# Patient Record
Sex: FEMALE | Race: BLACK OR AFRICAN AMERICAN | NOT HISPANIC OR LATINO | Employment: UNEMPLOYED | URBAN - METROPOLITAN AREA
[De-identification: names, ages, dates, MRNs, and addresses within clinical notes are randomized per-mention and may not be internally consistent; named-entity substitution may affect disease eponyms.]

---

## 2024-03-27 ENCOUNTER — HOSPITAL ENCOUNTER (OUTPATIENT)
Dept: RADIOLOGY | Facility: CLINIC | Age: 34
Discharge: HOME | End: 2024-03-27
Payer: MEDICAID

## 2024-03-27 ENCOUNTER — OFFICE VISIT (OUTPATIENT)
Dept: ORTHOPEDIC SURGERY | Facility: CLINIC | Age: 34
End: 2024-03-27
Payer: MEDICAID

## 2024-03-27 VITALS — BODY MASS INDEX: 44.41 KG/M2 | WEIGHT: 293 LBS | HEIGHT: 68 IN

## 2024-03-27 DIAGNOSIS — M25.562 CHRONIC PAIN OF LEFT KNEE: ICD-10-CM

## 2024-03-27 DIAGNOSIS — M17.12 PRIMARY OSTEOARTHRITIS OF LEFT KNEE: Primary | ICD-10-CM

## 2024-03-27 DIAGNOSIS — G89.29 CHRONIC PAIN OF LEFT KNEE: ICD-10-CM

## 2024-03-27 PROCEDURE — 20611 DRAIN/INJ JOINT/BURSA W/US: CPT | Performed by: FAMILY MEDICINE

## 2024-03-27 PROCEDURE — 1036F TOBACCO NON-USER: CPT | Performed by: FAMILY MEDICINE

## 2024-03-27 PROCEDURE — 99213 OFFICE O/P EST LOW 20 MIN: CPT | Performed by: FAMILY MEDICINE

## 2024-03-27 PROCEDURE — 73562 X-RAY EXAM OF KNEE 3: CPT | Mod: LT

## 2024-03-27 RX ORDER — TRIAMCINOLONE ACETONIDE 40 MG/ML
40 INJECTION, SUSPENSION INTRA-ARTICULAR; INTRAMUSCULAR
Status: COMPLETED | OUTPATIENT
Start: 2024-03-27 | End: 2024-03-27

## 2024-03-27 RX ORDER — LIDOCAINE HYDROCHLORIDE 20 MG/ML
2 INJECTION, SOLUTION INFILTRATION; PERINEURAL
Status: COMPLETED | OUTPATIENT
Start: 2024-03-27 | End: 2024-03-27

## 2024-03-27 RX ADMIN — LIDOCAINE HYDROCHLORIDE 2 ML: 20 INJECTION, SOLUTION INFILTRATION; PERINEURAL at 15:12

## 2024-03-27 RX ADMIN — TRIAMCINOLONE ACETONIDE 40 MG: 40 INJECTION, SUSPENSION INTRA-ARTICULAR; INTRAMUSCULAR at 15:12

## 2024-03-27 NOTE — PROGRESS NOTES
History of Present Illness   Chief Complaint   Patient presents with    Left Knee - Follow-up       The patient is 33 y.o. female  here for follow-up of left knee pain.  Patient was seen by me a little less than 2 years ago for some atraumatic left knee pain, x-rays at that time showed some mild degenerative changes, symptoms thought to be secondary to mild underlying osteoarthritis as well as related to her obesity.  Patient trialed some initial conservative management with meloxicam, activity modification however she had ongoing pain prompting us to treat her with a one-time knee joint injection with Kenalog.  She says this was of significant benefit in her knee had been doing really well until a few weeks ago.  Says that she has been trying to increase her activity levels in the gym, says that she went pretty hard in the gym for several days in a row and after this she developed some more notable pain in her knee, she points to the lateral aspect of her knee as area of discomfort, she has pain with walking, stairs, admits to some feelings of instability at times.  She is unsure if there is swelling.  She has been taking ibuprofen with minimal improvement in symptoms.  Patient says that her weight is relatively unchanged over the past 2 years, feels like it may be up even some, says they have been dealing with some hormonal issues that she thinks may be contributing    No past medical history on file.    Medication Documentation Review Audit    **Prior to Admission medications have not yet been reviewed**         No Known Allergies    Social History     Socioeconomic History    Marital status: Single     Spouse name: Not on file    Number of children: Not on file    Years of education: Not on file    Highest education level: Not on file   Occupational History    Not on file   Tobacco Use    Smoking status: Never    Smokeless tobacco: Never   Substance and Sexual Activity    Alcohol use: Yes    Drug use: Never     Sexual activity: Not on file   Other Topics Concern    Not on file   Social History Narrative    Not on file     Social Determinants of Health     Financial Resource Strain: Not on file   Food Insecurity: Not on file   Transportation Needs: Not on file   Physical Activity: Not on file   Stress: Not on file   Social Connections: Not on file   Intimate Partner Violence: Not on file   Housing Stability: Not on file       No past surgical history on file.       Review of Systems   GENERAL: Negative  GI: Negative  MUSCULOSKELETAL: See HPI  SKIN: Negative  NEURO:  Negative     Physical Exam:    General/Constitutional: well appearing, no distress, appears stated age.  obese  HEENT: sclera clear  Respiratory: non labored breathing  Vascular: No edema, swelling or tenderness, except as noted in detailed exam.  Integumentary: No impressive skin lesions present, except as noted in detailed exam.  Neurological:  Alert and oriented   Psychological:  Normal mood and affect.  Musculoskeletal: Normal, except as noted in detailed exam and in HPI.  Mildly antalgic gait, unassisted    Left knee: Normal appearance, no skin changes, no redness or warmth.  No joint effusion is present.  She does have some more notable lateral joint tenderness, there is some mild medial joint line tenderness.  Range of motion from 3 to 110 degrees with pain at endrange.  No motor deficits are present at the knee, there is pain with both resisted knee flexion and extension.  She has pain with Nicole testing laterally.  Negative Lachman, negative posterior drawer.  Stable to varus and valgus stress.       Imaging: Updated x-rays of the left knee obtained today and independently reviewed.  Relatively stable appearance of more mild degenerative changes, there is some mild medial joint space narrowing, there is osteophytosis in the medial and patellofemoral compartment    L Inj/Asp: L knee on 3/27/2024 3:12 PM  Indications: pain  Details: 22 G needle,  ultrasound-guided superolateral approach  Medications: 40 mg triamcinolone acetonide 40 mg/mL; 2 mL lidocaine 20 mg/mL (2 %)  Outcome: tolerated well, no immediate complications  Procedure, treatment alternatives, risks and benefits explained, specific risks discussed. Consent was given by the patient. Immediately prior to procedure a time out was called to verify the correct patient, procedure, equipment, support staff and site/side marked as required. Patient was prepped and draped in the usual sterile fashion.               Assessment   1. Primary osteoarthritis of left knee        2. Chronic pain of left knee  XR knee left 3 views    Point of Care Ultrasound            Plan: Discussed diagnosis, further workup and treatment.  We had discussion about how her weight is likely playing a role in her symptoms, fortunately she has not seen significant progression in her arthritis over the past 2 years.  It has been 2 years since her last knee joint injection, given this we did proceed with ultrasound-guided knee joint injection with Kenalog today.  We discussed potential risks of repetitive corticosteroid injection into her knee joint specifically given her young age.  Patient voiced understanding.  She does plan to focus on weight loss with diet and exercise.  She will plan to follow-up as needed.

## 2024-09-18 ENCOUNTER — APPOINTMENT (OUTPATIENT)
Dept: ORTHOPEDIC SURGERY | Facility: CLINIC | Age: 34
End: 2024-09-18
Payer: MEDICAID

## 2024-09-25 ENCOUNTER — OFFICE VISIT (OUTPATIENT)
Dept: ORTHOPEDIC SURGERY | Facility: CLINIC | Age: 34
End: 2024-09-25
Payer: MEDICAID

## 2024-09-25 ENCOUNTER — HOSPITAL ENCOUNTER (OUTPATIENT)
Dept: RADIOLOGY | Facility: EXTERNAL LOCATION | Age: 34
Discharge: HOME | End: 2024-09-25

## 2024-09-25 DIAGNOSIS — G89.29 CHRONIC PAIN OF LEFT KNEE: ICD-10-CM

## 2024-09-25 DIAGNOSIS — M17.12 PRIMARY OSTEOARTHRITIS OF LEFT KNEE: Primary | ICD-10-CM

## 2024-09-25 DIAGNOSIS — M25.562 CHRONIC PAIN OF LEFT KNEE: ICD-10-CM

## 2024-09-25 PROCEDURE — 2500000004 HC RX 250 GENERAL PHARMACY W/ HCPCS (ALT 636 FOR OP/ED): Performed by: FAMILY MEDICINE

## 2024-09-25 PROCEDURE — 99213 OFFICE O/P EST LOW 20 MIN: CPT | Performed by: FAMILY MEDICINE

## 2024-09-25 PROCEDURE — 20611 DRAIN/INJ JOINT/BURSA W/US: CPT | Mod: LT | Performed by: FAMILY MEDICINE

## 2024-09-25 PROCEDURE — 2500000005 HC RX 250 GENERAL PHARMACY W/O HCPCS: Performed by: FAMILY MEDICINE

## 2024-09-25 PROCEDURE — 1036F TOBACCO NON-USER: CPT | Performed by: FAMILY MEDICINE

## 2024-09-25 RX ORDER — LIDOCAINE HYDROCHLORIDE 20 MG/ML
2 INJECTION, SOLUTION INFILTRATION; PERINEURAL
Status: COMPLETED | OUTPATIENT
Start: 2024-09-25 | End: 2024-09-25

## 2024-09-25 RX ORDER — TRIAMCINOLONE ACETONIDE 40 MG/ML
40 INJECTION, SUSPENSION INTRA-ARTICULAR; INTRAMUSCULAR
Status: COMPLETED | OUTPATIENT
Start: 2024-09-25 | End: 2024-09-25

## 2024-09-25 NOTE — PROGRESS NOTES
History of Present Illness   Chief Complaint   Patient presents with    Left Knee - Follow-up       The patient is 33 y.o. female  here for follow-up of left knee pain.  Patient was last seen by me 6 months ago, see previous note for full details.  In brief patient has been dealing with some ongoing left knee pain over the past several years, x-rays have shown mild osteoarthritis, symptoms thought to be secondary to this in addition to her obesity playing a role.  She has had 2 prior knee joint injections, one 2 and half years ago, one 6 months ago both were of good benefit, initial injection provided longer lasting relief, more recent injection provided short-term relief with more recent recurrence of pain over the past few weeks.  We have had previous discussions about patient's weight, says she was working through some hormonal issues at her last visit.  She had joined a gym and lost 20 pounds but did stop for around 2 months and gained the weight back, she is planning to start back in this gym in October.  She did miss a few days of work because of her knee pain.  Pain is generalized, worse with walking, standing.  Patient works at [employer], is on her feet throughout the day.  She feels like there is some swelling, she admits to some popping and clicking.          No past medical history on file.    Medication Documentation Review Audit       Reviewed by Eugenio Jones MD (Physician) on 03/27/24 at 1530      Medication Order Taking? Sig Documenting Provider Last Dose Status            No Medications to Display                                   No Known Allergies    Social History     Socioeconomic History    Marital status: Single     Spouse name: Not on file    Number of children: Not on file    Years of education: Not on file    Highest education level: Not on file   Occupational History    Not on file   Tobacco Use    Smoking status: Never    Smokeless tobacco: Never   Substance and Sexual Activity     Alcohol use: Yes    Drug use: Never    Sexual activity: Not on file   Other Topics Concern    Not on file   Social History Narrative    Not on file     Social Determinants of Health     Financial Resource Strain: Not on file   Food Insecurity: Not on file   Transportation Needs: Not on file   Physical Activity: Not on file   Stress: Not on file   Social Connections: Not on file   Intimate Partner Violence: Not on file   Housing Stability: Not on file       No past surgical history on file.       Review of Systems   GENERAL: Negative  GI: Negative  MUSCULOSKELETAL: See HPI  SKIN: Negative  NEURO:  Negative     Physical Exam:    General/Constitutional: well appearing, no distress, appears stated age.  obese  HEENT: sclera clear  Respiratory: non labored breathing  Vascular: No edema, swelling or tenderness, except as noted in detailed exam.  Integumentary: No impressive skin lesions present, except as noted in detailed exam.  Neurological:  Alert and oriented   Psychological:  Normal mood and affect.  Musculoskeletal: Normal, except as noted in detailed exam and in HPI.  Mildly antalgic gait, unassisted    Left knee: Normal appearance, no skin changes, no redness or warmth.  No joint effusion is present.  There is medial and lateral joint tenderness to palpation.  Range of motion from 3 to 110 degrees with pain at endrange.  No motor deficits are present at the knee, there is pain with both resisted knee flexion and extension.  She has pain with Nicole testing laterally.  Negative Lachman, negative posterior drawer.  Stable to varus and valgus stress.       Imaging: No new imaging today, previous x-ray showed some mild degenerative changes    L Inj/Asp: L knee on 9/25/2024 9:49 AM  Indications: pain  Details: 22 G needle, ultrasound-guided superolateral approach  Medications: 40 mg triamcinolone acetonide 40 mg/mL; 2 mL lidocaine 20 mg/mL (2 %)  Outcome: tolerated well, no immediate complications  Procedure,  treatment alternatives, risks and benefits explained, specific risks discussed. Consent was given by the patient. Immediately prior to procedure a time out was called to verify the correct patient, procedure, equipment, support staff and site/side marked as required. Patient was prepped and draped in the usual sterile fashion.               Assessment   1. Primary osteoarthritis of left knee        2. Chronic pain of left knee  Point of Care Ultrasound              Plan: Discussed diagnosis, further workup and treatment.  Again discussed the importance of weight loss as part of her overall treatment regimen.  Patient was interested in repeat corticosteroid injection given degree of discomfort, time away from work.  We did proceed with repeat ultrasound-guided left knee joint injection with Kenalog today.  This is the patient's third injection over the past 2 years.  We did again discuss potential concern for repetitive corticosteroid injection with potential for aggravation of underlying osteoarthritis.  Patient voiced understanding but wanted to proceed.  She has missed some work, we did fill out some paperwork for her time away from work, plans to return to work this weekend without restrictions.  Patient will follow-up as symptoms dictate.